# Patient Record
(demographics unavailable — no encounter records)

---

## 2025-07-29 NOTE — PHYSICAL EXAM
[Chaperoned Physical Exam] : A chaperone was present in the examining room during all aspects of the physical examination. [MA] : MA [FreeTextEntry2] : Erendira [Appropriately responsive] : appropriately responsive [Alert] : alert [No Acute Distress] : no acute distress [No Lymphadenopathy] : no lymphadenopathy [Regular Rate Rhythm] : regular rate rhythm [No Murmurs] : no murmurs [Clear to Auscultation B/L] : clear to auscultation bilaterally [Soft] : soft [Non-tender] : non-tender [Non-distended] : non-distended [No HSM] : No HSM [No Lesions] : no lesions [No Mass] : no mass [Oriented x3] : oriented x3 [Examination Of The Breasts] : a normal appearance [No Masses] : no breast masses were palpable [Vulvar Atrophy] : vulvar atrophy [Labia Majora] : normal [Labia Minora] : normal [Normal] : normal [Atrophy] : atrophy [Absent] : absent [Uterine Adnexae] : normal [Declined] : Patient declined rectal exam

## 2025-07-29 NOTE — DISCUSSION/SUMMARY
[FreeTextEntry1] : 57-year-old status post JIE presents for GYN evaluation, complains of occasional hot flashes despite estradiol.  No discharge or itching.  Pap GC chlamydia sent and mammogram ordered.  Estradiol increased and patient to return in 3 months for follow-up.  All questions answered.

## 2025-07-29 NOTE — HISTORY OF PRESENT ILLNESS
[N] : Patient does not use contraception [Y] : Positive pregnancy history [Menarche Age: ____] : age at menarche was [unfilled] [FreeTextEntry1] : 57-year-old -0-0-4 status post JIE presents for GYN evaluation.  Denies pain bleeding or discharge.  Patient complains of occasional hot flashes.. [PGHxTotal] : 4 [[facility] : [facility] [PGHxPremature] : 0 [PGHxAbortions] : 0 [[facility] 4 [PGHxABInduced] : 0 [PGHxABSpont] : 0 [PGHxEctopic] : 0 [PGHxMultBirths] : 0